# Patient Record
Sex: MALE | Race: BLACK OR AFRICAN AMERICAN | Employment: FULL TIME | URBAN - METROPOLITAN AREA
[De-identification: names, ages, dates, MRNs, and addresses within clinical notes are randomized per-mention and may not be internally consistent; named-entity substitution may affect disease eponyms.]

---

## 2019-07-18 ENCOUNTER — HOSPITAL ENCOUNTER (EMERGENCY)
Age: 34
Discharge: HOME | End: 2019-07-18
Attending: EMERGENCY MEDICINE
Payer: SELF-PAY

## 2019-07-18 ENCOUNTER — APPOINTMENT (OUTPATIENT)
Dept: GENERAL RADIOLOGY | Age: 34
End: 2019-07-18
Attending: PHYSICIAN ASSISTANT
Payer: SELF-PAY

## 2019-07-18 VITALS
RESPIRATION RATE: 16 BRPM | HEART RATE: 86 BPM | WEIGHT: 167 LBS | DIASTOLIC BLOOD PRESSURE: 94 MMHG | BODY MASS INDEX: 22.62 KG/M2 | HEIGHT: 72 IN | SYSTOLIC BLOOD PRESSURE: 135 MMHG | TEMPERATURE: 96.5 F | OXYGEN SATURATION: 100 %

## 2019-07-18 DIAGNOSIS — S21.211A LACERATION OF RIGHT SIDE OF BACK, INITIAL ENCOUNTER: Primary | ICD-10-CM

## 2019-07-18 DIAGNOSIS — Z23 NEED FOR TETANUS BOOSTER: ICD-10-CM

## 2019-07-18 PROCEDURE — 90715 TDAP VACCINE 7 YRS/> IM: CPT | Performed by: PHYSICIAN ASSISTANT

## 2019-07-18 PROCEDURE — 90471 IMMUNIZATION ADMIN: CPT

## 2019-07-18 PROCEDURE — 71046 X-RAY EXAM CHEST 2 VIEWS: CPT

## 2019-07-18 PROCEDURE — 74011250636 HC RX REV CODE- 250/636: Performed by: PHYSICIAN ASSISTANT

## 2019-07-18 PROCEDURE — 99283 EMERGENCY DEPT VISIT LOW MDM: CPT

## 2019-07-18 PROCEDURE — 77030002916 HC SUT ETHLN J&J -A

## 2019-07-18 PROCEDURE — 75810000293 HC SIMP/SUPERF WND  RPR

## 2019-07-18 RX ORDER — LIDOCAINE HYDROCHLORIDE 20 MG/ML
10 INJECTION, SOLUTION INFILTRATION; PERINEURAL
Status: COMPLETED | OUTPATIENT
Start: 2019-07-18 | End: 2019-07-18

## 2019-07-18 RX ADMIN — LIDOCAINE HYDROCHLORIDE 200 MG: 20 INJECTION, SOLUTION INFILTRATION; PERINEURAL at 00:56

## 2019-07-18 RX ADMIN — TETANUS TOXOID, REDUCED DIPHTHERIA TOXOID AND ACELLULAR PERTUSSIS VACCINE, ADSORBED 0.5 ML: 5; 2.5; 8; 8; 2.5 SUSPENSION INTRAMUSCULAR at 01:32

## 2019-07-18 NOTE — LETTER
[facility] [place] 
THE [facility] EMERGENCY DEPT 
[address] [address] [phone] Work/School Note Date: 7/18/2019 To Whom It May concern: 
 
Michele Damon was seen and treated today in the emergency room by the following provider(s): 
Attending Provider: Denae Rosenbaum MD 
Physician Assistant: SLAVA Valentin. Michele Damon may return to work on 7/22/19 and needs 1 week of light duty due to injury.  
 
Sincerely, 
 
 
 
 
SLAVA Regalado

## 2019-07-18 NOTE — ED TRIAGE NOTES
Pt arrived to ER from home via friend with clean laceration to right lateral posterior back. Approximately 3 x 1 inches. States he fell off bed and unsure of what happened.

## 2019-07-18 NOTE — ED NOTES
Pt states that he does not want the police contacted. Two males that state they are \"brothers\" to pt are cooperative and calm in the room. Deny any weapons on their persons. Notified  to not allow any more visitors to help ensure security.

## 2019-07-18 NOTE — DISCHARGE INSTRUCTIONS

## 2019-07-18 NOTE — ED PROVIDER NOTES
EMERGENCY DEPARTMENT HISTORY AND PHYSICAL EXAM    Date: 7/18/2019  Patient Name: Melanie Forde    History of Presenting Illness     Chief Complaint   Patient presents with    Laceration         History Provided By: Patient    Melanie Forde is a 37 y.o. male who presents to the emergency department C/O laceration. Associated sxs include back pain from wound. Patient endorses intoxication ambulatory into the emergency department accompanied by roommate for wound to his right mid back. Patient states that he must have fallen sustaining laceration. Patient very vague about his story and not forthcoming with information though does endorse having some sort of disagreement with his \"baby mama\". Patient is unwilling to explain the wound continues to state that he does not want to get anybody in any trouble. States \"I just want to make sure I am good\". Last tetanus is unknown. She has no allergies to medications and otherwise healthy. Pt denies chest pain shortness of breath neck pain head injury injury anywhere else, and any other sxs or complaints. PCP: No primary care provider on file. Current Facility-Administered Medications   Medication Dose Route Frequency Provider Last Rate Last Dose    diph,Pertuss(AC),Tet Vac-PF (BOOSTRIX) suspension 0.5 mL  0.5 mL IntraMUSCular NOW Davina Dumont PA           Past History     Past Medical History:  No past medical history on file. Past Surgical History:  No past surgical history on file. Family History:  No family history on file. Social History:  Social History     Tobacco Use    Smoking status: Current Some Day Smoker   Substance Use Topics    Alcohol use: Yes    Drug use: Yes     Types: Marijuana       Allergies:  No Known Allergies      Review of Systems   Review of Systems   Respiratory: Negative for shortness of breath. Cardiovascular: Negative for chest pain. Gastrointestinal: Negative for abdominal pain and vomiting. Musculoskeletal: Negative for gait problem and neck pain. Skin: Positive for wound. All other systems reviewed and are negative. Physical Exam     Vitals:    07/18/19 0011   BP: 158/85   Pulse: (!) 101   Resp: 18   Temp: 96.5 °F (35.8 °C)   SpO2: 100%   Weight: 75.8 kg (167 lb)   Height: 6' (1.829 m)     Physical Exam   Constitutional: He is oriented to person, place, and time. He appears well-developed and well-nourished. Smells of alcohol though answers questions appropriately slightly anxious appearing   HENT:   Head: Normocephalic and atraumatic. Eyes: Pupils are equal, round, and reactive to light. Conjunctivae and EOM are normal.   Neck: Normal range of motion. Neck supple. Cardiovascular: Normal rate and regular rhythm. Pulmonary/Chest: Effort normal and breath sounds normal.   Abdominal: Soft. Bowel sounds are normal. He exhibits no distension. There is no tenderness. There is no rebound and no guarding. Musculoskeletal: Normal range of motion. Neurological: He is alert and oriented to person, place, and time. Skin: Skin is warm and dry. Laceration noted. No bruising noted. 8 cm laceration to right mid back as shown slightly elliptical has gone through the top layers of skin and subcu tissue muscular layer exposed but does not appear to be violated, ribs exposed   Psychiatric: He has a normal mood and affect. His behavior is normal.   Nursing note and vitals reviewed. Diagnostic Study Results     Labs -   No results found for this or any previous visit (from the past 12 hour(s)).     Radiologic Studies -   XR CHEST PA LAT    (Results Pending)     CT Results  (Last 48 hours)    None        CXR Results  (Last 48 hours)    None          Medications given in the ED-  Medications   diph,Pertuss(AC),Tet Vac-PF (BOOSTRIX) suspension 0.5 mL (has no administration in time range)   lidocaine (XYLOCAINE) 20 mg/mL (2 %) injection 200 mg (200 mg SubCUTAneous Given 7/18/19 0056) Medical Decision Making   I am the first provider for this patient. I reviewed the vital signs, available nursing notes, past medical history, past surgical history, family history and social history. Vital Signs-Reviewed the patient's vital signs. Pulse Oximetry Analysis - 100% on RA     Records Reviewed: Nursing Notes    Procedures:  Wound Repair  Date/Time: 7/18/2019 1:18 AM  Performed by: PAPreparation: skin prepped with Shur-Clens and sterile field established  Pre-procedure re-eval: Immediately prior to the procedure, the patient was reevaluated and found suitable for the planned procedure and any planned medications. Time out: Immediately prior to the procedure a time out was called to verify the correct patient, procedure, equipment, staff and marking as appropriate. .  Location details: back  Wound length:7.6 - 12.5 cm  Anesthesia: local infiltration    Anesthesia:  Local Anesthetic: lidocaine 2% without epinephrine  Foreign bodies: no foreign bodies  Debridement: none  Wound skin closure material used: 3-0. Number of sutures: 10  Technique: simple and interrupted  Approximation: close  Dressing: 4x4  Patient tolerance: Patient tolerated the procedure well with no immediate complications  My total time at bedside, performing this procedure was 16-30 minutes. ED Course:   12:15 AM   Initial assessment performed. The patients presenting problems have been discussed, and they are in agreement with the care plan formulated and outlined with them. I have encouraged them to ask questions as they arise throughout their visit. Discussion: 37 y.o. male ambulatory to the emergency department with back laceration status post reported fall. Wound repaired tetanus updated chest x-ray shows no bony or lung involvement. Patient is neurovascular intact and otherwise has no complaints. Wound care instructions given. Strict return precautions discussed.          Diagnosis and Disposition DISCHARGE NOTE:  Joann Waldron  results have been reviewed with him. He has been counseled regarding his diagnosis, treatment, and plan. He verbally conveys understanding and agreement of the signs, symptoms, diagnosis, treatment and prognosis and additionally agrees to follow up as discussed. He also agrees with the care-plan and conveys that all of his questions have been answered. I have also provided discharge instructions for him that include: educational information regarding their diagnosis and treatment, and list of reasons why they would want to return to the ED prior to their follow-up appointment, should his condition change. He has been provided with education for proper emergency department utilization. CLINICAL IMPRESSION:    1. Laceration of right side of back, initial encounter    2. Need for tetanus booster        PLAN:  1. D/C Home  2. There are no discharge medications for this patient. 3.   Follow-up Information     Follow up With Specialties Details Why [address]    THE [facility] EMERGENCY DEPT Emergency Medicine In 2 weeks For suture removal [address] [id number]  [phone]    [id number] [place] [address]  Schedule an appointment as soon as possible for a visit For suture removal, for primary care follow up [address] [facility], [address] [facility]  [phone]            Please note that this dictation was completed with [employer], the computer voice recognition software. Quite often unanticipated grammatical, syntax, homophones, and other interpretive errors are inadvertently transcribed by the computer software. Please disregard these errors. Please excuse any errors that have escaped final proofreading.

## 2019-08-01 ENCOUNTER — HOSPITAL ENCOUNTER (EMERGENCY)
Age: 34
Discharge: HOME | End: 2019-08-01
Attending: EMERGENCY MEDICINE
Payer: SELF-PAY

## 2019-08-01 VITALS
OXYGEN SATURATION: 99 % | SYSTOLIC BLOOD PRESSURE: 148 MMHG | TEMPERATURE: 98 F | HEART RATE: 71 BPM | RESPIRATION RATE: 16 BRPM | DIASTOLIC BLOOD PRESSURE: 91 MMHG

## 2019-08-01 DIAGNOSIS — Z48.02 VISIT FOR SUTURE REMOVAL: Primary | ICD-10-CM

## 2019-08-01 DIAGNOSIS — T81.30XA WOUND DEHISCENCE: ICD-10-CM

## 2019-08-01 PROCEDURE — 75810000275 HC EMERGENCY DEPT VISIT NO LEVEL OF CARE

## 2019-08-01 NOTE — DISCHARGE INSTRUCTIONS
Allow Steri-Strips to fall off  Limited activity to allow wound to heal  Any issues return to ER     Opened Cut After Surgery: Care Instructions  Your Care Instructions  Sometimes a cut made during surgery opens when it isn't supposed to. This may be because of an infection or another problem that keeps the cut's edges from staying together. The doctor has checked your open cut. He or she may have put a dressing in the cut but left it open to heal. This lets the cut heal from the bottom up. Your doctor may have given you a vacuum device to take home that helps close the cut. A cut may be left open when it is infected or likely to become infected. This is because closing the cut may make an existing infection worse and a new infection more likely. You will have a bandage. Follow-up care is a key part of your treatment and safety. Be sure to make and go to all appointments, and call your doctor if you are having problems. It's also a good idea to know your test results and keep a list of the medicines you take. How can you care for yourself at home? · You may shower with soap and water. Your doctor will tell you when it is safe to use a bathtub or go swimming. · If your doctor told you how to care for your cut, follow your doctor's instructions. If you did not get instructions, follow this general advice:  ? Wash around the cut with clean water 2 times a day. Don't use hydrogen peroxide or alcohol, which can slow healing. · Avoid any activity that could cause your cut to get worse. For example, if your cut is in the belly, avoid lifting anything that would make you strain. This may include heavy grocery bags and milk containers, a heavy briefcase or backpack, cat litter or dog food bags, a vacuum , or a child. · Take pain medicines exactly as directed. ? If the doctor gave you a prescription medicine for pain, take it as prescribed.   ? If you are not taking a prescription pain medicine, ask your doctor if you can take an over-the-counter medicine. · If your doctor prescribed antibiotics, take them as directed. Do not stop taking them just because you feel better. You need to take the full course of antibiotics. · If your cut is packed (gauze is put into the cut), follow your doctor's instructions on how often and how to repack the cut. A home health worker may do this for you. When should you call for help? Call your doctor now or seek immediate medical care if:    · The cut gets bigger.     · You can see organs under the skin.     · You have new pain, or your pain gets worse.     · The cut starts to bleed, and blood soaks through the bandage. Oozing small amounts of blood is normal.     · The skin near the cut is cold or pale or changes color.     · You have tingling, weakness, or numbness near the cut.     · You have trouble moving the area near the cut.     · You have symptoms of infection, such as:  ? Increased pain, swelling, warmth, or redness around the cut.  ? Red streaks leading from the cut.  ? Pus draining from the cut.  ? A fever.    Watch closely for changes in your health, and be sure to contact your doctor if:    · The cut is not closing (getting smaller).     · You do not get better as expected. Where can you learn more? Go to http://valeria-kenya.info/. Enter Q601 in the search box to learn more about \"Opened Cut After Surgery: Care Instructions. \"  Current as of: September 23, 2018  Content Version: 12.1  © 1064-4689 Healthwise, Incorporated. Care instructions adapted under license by [employer] (which disclaims liability or warranty for this information). If you have questions about a medical condition or this instruction, always ask your healthcare professional. [place] [address] any warranty or liability for your use of this information.             Learning About Stitches and Staples Removal  When are stitches and staples removed? Your doctor will tell you when to have your stitches or staples removed, usually in 7 to 14 days. How long you'll be told to wait will depend on things like where the wound is located, how big and how deep the wound is, and what your general health is like. Do not remove the stitches on your own. Stitches on the face are usually removed within a week. But stitches and staples on other areas of the body, such as on the back or belly or over a joint, may need to stay in place longer, often a week or two. Be sure to follow your doctor's instructions. How are stitches and staples removed? It usually doesn't hurt when the doctor removes the stitches or staples. You may feel a tug as each stitch or staple is removed. · You will either be seated or lying down. · To remove stitches, the doctor will use scissors to cut each of the knots and then pull the threads out. · To remove staples, the doctor will use a tool to take out the staples one at a time. · The area may still feel tender after the stitches or staples are gone. But it should feel better within a few minutes or up to a few hours. What can you expect after stitches and staples are removed? Depending on the type and location of the cut, you will have a scar. Scars usually fade over time. Keep the area clean, but you won't need a bandage. When should you call for help? Call your doctor now or seek immediate medical care if :  · You have new pain, or your pain gets worse. · You have trouble moving the area near the scar. · You have symptoms of infection, such as:  ? Increased pain, swelling, warmth, or redness around the scar. ? Red streaks leading from the scar. ? Pus draining from the scar. ? A fever. Watch closely for changes in your health, and be sure to contact your doctor if:  · The scar opens. · You do not get better as expected. Follow-up care is a key part of your treatment and safety.  Be sure to make and go to all appointments, and call your doctor if you do not get better as expected. It's also a good idea to keep a list of the medicines you take. Where can you learn more? Go to http://valeria-kenya.info/. Enter Q523 in the search box to learn more about \"Learning About Stitches and Staples Removal.\"  Current as of: September 23, 2018  Content Version: 12.1  © 3912-7335 Healthwise, Incorporated. Care instructions adapted under license by [employer] (which disclaims liability or warranty for this information). If you have questions about a medical condition or this instruction, always ask your healthcare professional. [address] any warranty or liability for your use of this information.

## 2019-08-01 NOTE — ED PROVIDER NOTES
EMERGENCY DEPARTMENT HISTORY AND PHYSICAL EXAM    Date: 8/1/2019  Patient Name: Jade Godinez    History of Presenting Illness     Time Seen:4:50 PM    Chief Complaint   Patient presents with    Suture Removal       History Provided By: Patient    Additional History (Context):   Jade Godinez is a 37 y.o. male presents to the emergency room for suture removal.  Patient sustained a laceration to his right back (mid) on July 18, 2019. Had 10 stitches placed at that time. Denies any bleeding or drainage coming from the wound. Little itchiness around the sutures. PCP: None        Past History     Past Medical History:  History reviewed. No pertinent past medical history. Past Surgical History:  History reviewed. No pertinent surgical history. Family History:  History reviewed. No pertinent family history. Social History:  Social History     Tobacco Use    Smoking status: Current Some Day Smoker   Substance Use Topics    Alcohol use: Yes    Drug use: Yes     Types: Marijuana       Allergies:  No Known Allergies      Review of Systems   Review of Systems   Skin: Positive for wound. All other systems reviewed and are negative. Physical Exam     Vitals:    08/01/19 1653   BP: (!) 148/91   Pulse: 71   Resp: 16   Temp: 98 °F (36.7 °C)   SpO2: 99%     Physical Exam   Constitutional: He is oriented to person, place, and time. He appears well-developed and well-nourished. No distress. Cardiovascular: Normal rate and regular rhythm. Pulmonary/Chest: Effort normal and breath sounds normal.   Neurological: He is alert and oriented to person, place, and time. Skin: Skin is warm and dry. Laceration to the right mid back. Sutures are still in tact. Edges appear to be well approximated. There is no surrounding erythema or induration. No active drainage or bleeding. Nontender. Psychiatric: He has a normal mood and affect. Nursing note and vitals reviewed.       Nursing note and vitals reviewed         Diagnostic Study Results     Labs -   No results found for this or any previous visit (from the past 12 hour(s)). Radiologic Studies   No orders to display     CT Results  (Last 48 hours)    None        CXR Results  (Last 48 hours)    None            Medical Decision Making   I am the first provider for this patient. I reviewed the vital signs, available nursing notes, past medical history, past surgical history, family history and social history. Vital Signs-Reviewed the patient's vital signs. Records Reviewed: Nursing Notes    Provider Notes:   37 y.o. male presents emergency room for suture removal from laceration in the right mid back. He sustained approximately 2 weeks ago. Procedures:  Suture/Staple Removal  Date/Time: 8/1/2019 4:53 PM  Performed by: SLAVA Bear  Authorized by: SLAVA Bear     Consent:     Consent obtained:  Verbal    Consent given by:  Patient    Risks discussed:  Bleeding, pain and wound separation  Location:     Location:  Trunk    Trunk location:  Back  Procedure details:     Wound appearance:  No signs of infection, nontender and clean    Number of sutures removed:  10  Post-procedure details:     Post-removal:  Steri-Strips applied    Patient tolerance of procedure: Tolerated well, no immediate complications  Comments: There was some wound dehiscence noted in the mid wound. Steri-Strips x5 were placed to approximate. Advised patient will probably need to keep him out of work a little bit longer due to the wound not completely healed. We will keep him out of work until August 12, 2019. Discharge home. ED Course:   Initial assessment performed. The patients presenting problems have been discussed, and they are in agreement with the care plan formulated and outlined with them. I have encouraged them to ask questions as they arise throughout their visit.          Diagnosis and Disposition       DISCHARGE NOTE:  4:56 GHASSAN Pelletier's  results have been reviewed with him. He has been counseled regarding his diagnosis, treatment, and plan. He verbally conveys understanding and agreement of the signs, symptoms, diagnosis, treatment and prognosis and additionally agrees to follow up as discussed. He also agrees with the care-plan and conveys that all of his questions have been answered. I have also provided discharge instructions for him that include: educational information regarding their diagnosis and treatment, and list of reasons why they would want to return to the ED prior to their follow-up appointment, should his condition change. He has been provided with education for proper emergency department utilization. CLINICAL IMPRESSION:    1. Visit for suture removal    2. Wound dehiscence        PLAN:  1. D/C Home  2. There are no discharge medications for this patient. 3.   Follow-up Information    None       ____________________________________     Please note that this dictation was completed with [email or web address], the computer voice recognition software. Quite often unanticipated grammatical, syntax, homophones, and other interpretive errors are inadvertently transcribed by the computer software. Please disregard these errors. Please excuse any errors that have escaped final proofreading.

## 2019-08-01 NOTE — ED TRIAGE NOTES
Laceration to left upper back area with sutures in place/intact  Some scabbed over areas to suture line;

## 2024-06-24 ENCOUNTER — TRANSCRIBE ORDERS (OUTPATIENT)
Facility: HOSPITAL | Age: 39
End: 2024-06-24

## 2024-06-24 DIAGNOSIS — M54.12 CERVICAL RADICULOPATHY: Primary | ICD-10-CM

## 2024-07-01 ENCOUNTER — HOSPITAL ENCOUNTER (OUTPATIENT)
Facility: HOSPITAL | Age: 39
Discharge: HOME | End: 2024-07-04
Payer: COMMERCIAL

## 2024-07-01 DIAGNOSIS — M54.12 CERVICAL RADICULOPATHY: ICD-10-CM

## 2024-07-01 PROCEDURE — 72141 MRI NECK SPINE W/O DYE: CPT
